# Patient Record
Sex: FEMALE | Race: WHITE
[De-identification: names, ages, dates, MRNs, and addresses within clinical notes are randomized per-mention and may not be internally consistent; named-entity substitution may affect disease eponyms.]

---

## 2017-12-06 ENCOUNTER — HOSPITAL ENCOUNTER (OUTPATIENT)
Dept: HOSPITAL 53 - M WUC | Age: 67
End: 2017-12-06
Attending: NURSE PRACTITIONER
Payer: COMMERCIAL

## 2017-12-06 DIAGNOSIS — I10: Primary | ICD-10-CM

## 2017-12-06 LAB
ALBUMIN SERPL BCG-MCNC: 3.7 GM/DL (ref 3.2–5.2)
ALBUMIN/GLOB SERPL: 1.06 {RATIO} (ref 1–1.93)
ALP SERPL-CCNC: 62 U/L (ref 45–117)
ALT SERPL W P-5'-P-CCNC: 59 U/L (ref 12–78)
ANION GAP SERPL CALC-SCNC: 6 MEQ/L (ref 8–16)
AST SERPL-CCNC: 34 U/L (ref 7–37)
BASOPHILS # BLD AUTO: 0.1 10^3/UL (ref 0–0.2)
BASOPHILS NFR BLD AUTO: 0.8 % (ref 0–1)
BILIRUB SERPL-MCNC: 0.7 MG/DL (ref 0.2–1)
BUN SERPL-MCNC: 12 MG/DL (ref 7–18)
CALCIUM SERPL-MCNC: 9 MG/DL (ref 8.8–10.2)
CHLORIDE SERPL-SCNC: 104 MEQ/L (ref 98–107)
CHOLEST SERPL-MCNC: 249 MG/DL (ref ?–200)
CO2 SERPL-SCNC: 30 MEQ/L (ref 21–32)
CREAT SERPL-MCNC: 0.71 MG/DL (ref 0.55–1.02)
EOSINOPHIL # BLD AUTO: 0.1 10^3/UL (ref 0–0.5)
EOSINOPHIL NFR BLD AUTO: 1.8 % (ref 0–3)
ERYTHROCYTE [DISTWIDTH] IN BLOOD BY AUTOMATED COUNT: 12.6 % (ref 11.5–14.5)
EST. AVERAGE GLUCOSE BLD GHB EST-MCNC: 183 MG/DL (ref 60–110)
GFR SERPL CREATININE-BSD FRML MDRD: > 60 ML/MIN/{1.73_M2} (ref 45–?)
GLUCOSE SERPL-MCNC: 165 MG/DL (ref 80–110)
IMM GRANULOCYTES NFR BLD: 0.3 % (ref 0–0)
LYMPHOCYTES # BLD AUTO: 3.3 10^3/UL (ref 1.5–4.5)
LYMPHOCYTES NFR BLD AUTO: 45.7 % (ref 24–44)
MCH RBC QN AUTO: 29.6 PG (ref 27–33)
MCHC RBC AUTO-ENTMCNC: 32.4 G/DL (ref 32–36.5)
MCV RBC AUTO: 91.5 FL (ref 80–96)
MONOCYTES # BLD AUTO: 0.5 10^3/UL (ref 0–0.8)
MONOCYTES NFR BLD AUTO: 6.7 % (ref 0–5)
NEUTROPHILS # BLD AUTO: 3.2 10^3/UL (ref 1.8–7.7)
NEUTROPHILS NFR BLD AUTO: 44.7 % (ref 36–66)
NRBC BLD AUTO-RTO: 0 % (ref 0–0)
PLATELET # BLD AUTO: 202 10^3/UL (ref 150–450)
POTASSIUM SERPL-SCNC: 4.6 MEQ/L (ref 3.5–5.1)
PROT SERPL-MCNC: 7.2 GM/DL (ref 6.4–8.2)
SODIUM SERPL-SCNC: 140 MEQ/L (ref 136–145)
T4 FREE SERPL-MCNC: 0.97 NG/DL (ref 0.76–1.46)
TRIGL SERPL-MCNC: 187 MG/DL (ref ?–150)
WBC # BLD AUTO: 7.1 10^3/UL (ref 4–10)

## 2019-11-26 ENCOUNTER — HOSPITAL ENCOUNTER (OUTPATIENT)
Dept: HOSPITAL 53 - M WUC | Age: 69
End: 2019-11-26
Attending: NURSE PRACTITIONER
Payer: COMMERCIAL

## 2019-11-26 DIAGNOSIS — Z79.899: ICD-10-CM

## 2019-11-26 DIAGNOSIS — E55.9: ICD-10-CM

## 2019-11-26 DIAGNOSIS — E11.9: Primary | ICD-10-CM

## 2019-11-26 LAB
ALBUMIN SERPL BCG-MCNC: 3.8 GM/DL (ref 3.2–5.2)
ALT SERPL W P-5'-P-CCNC: 68 U/L (ref 12–78)
BASOPHILS # BLD AUTO: 0.1 10^3/UL (ref 0–0.2)
BASOPHILS NFR BLD AUTO: 0.8 % (ref 0–1)
BILIRUB SERPL-MCNC: 0.6 MG/DL (ref 0.2–1)
BUN SERPL-MCNC: 13 MG/DL (ref 7–18)
CALCIUM SERPL-MCNC: 8.7 MG/DL (ref 8.8–10.2)
CHLORIDE SERPL-SCNC: 105 MEQ/L (ref 98–107)
CHOLEST SERPL-MCNC: 228 MG/DL (ref ?–200)
CHOLEST/HDLC SERPL: 6 {RATIO} (ref ?–5)
CO2 SERPL-SCNC: 30 MEQ/L (ref 21–32)
CREAT SERPL-MCNC: 0.68 MG/DL (ref 0.55–1.3)
EOSINOPHIL # BLD AUTO: 0.1 10^3/UL (ref 0–0.5)
EOSINOPHIL NFR BLD AUTO: 1.6 % (ref 0–3)
EST. AVERAGE GLUCOSE BLD GHB EST-MCNC: 169 MG/DL (ref 60–110)
GFR SERPL CREATININE-BSD FRML MDRD: > 60 ML/MIN/{1.73_M2} (ref 45–?)
GLUCOSE SERPL-MCNC: 145 MG/DL (ref 70–100)
HCT VFR BLD AUTO: 39.3 % (ref 36–47)
HDLC SERPL-MCNC: 38 MG/DL (ref 40–?)
HGB BLD-MCNC: 12.3 G/DL (ref 12–15.5)
LDLC SERPL CALC-MCNC: 155 MG/DL (ref ?–100)
LYMPHOCYTES # BLD AUTO: 2.3 10^3/UL (ref 1.5–5)
LYMPHOCYTES NFR BLD AUTO: 36.7 % (ref 24–44)
MCH RBC QN AUTO: 29.5 PG (ref 27–33)
MCHC RBC AUTO-ENTMCNC: 31.3 G/DL (ref 32–36.5)
MCV RBC AUTO: 94.2 FL (ref 80–96)
MONOCYTES # BLD AUTO: 0.5 10^3/UL (ref 0–0.8)
MONOCYTES NFR BLD AUTO: 8.5 % (ref 0–5)
NEUTROPHILS # BLD AUTO: 3.3 10^3/UL (ref 1.5–8.5)
NEUTROPHILS NFR BLD AUTO: 51.9 % (ref 36–66)
NONHDLC SERPL-MCNC: 190 MG/DL
PLATELET # BLD AUTO: 179 10^3/UL (ref 150–450)
POTASSIUM SERPL-SCNC: 4.3 MEQ/L (ref 3.5–5.1)
PROT SERPL-MCNC: 6.9 GM/DL (ref 6.4–8.2)
RBC # BLD AUTO: 4.17 10^6/UL (ref 4–5.4)
SODIUM SERPL-SCNC: 140 MEQ/L (ref 136–145)
TRIGL SERPL-MCNC: 173 MG/DL (ref ?–150)
TSH SERPL DL<=0.005 MIU/L-ACNC: 0.96 UIU/ML (ref 0.36–3.74)
WBC # BLD AUTO: 6.3 10^3/UL (ref 4–10)

## 2020-11-19 ENCOUNTER — HOSPITAL ENCOUNTER (OUTPATIENT)
Dept: HOSPITAL 53 - M RAD | Age: 70
End: 2020-11-19
Attending: PODIATRIST
Payer: COMMERCIAL

## 2020-11-19 DIAGNOSIS — M66.261: Primary | ICD-10-CM

## 2020-11-19 NOTE — REP
INDICATION:

SPONTANEOUS RUPTURE OF EXTENSOR TENDONS, RIGHT LOW.



COMPARISON:

None.



TECHNIQUE:

Multiple sequences are obtained in the axial, coronal and sagittal planes.



FINDINGS:

The Achilles, anterior tibial, posterior tibial, flexor hallucis longus, and flexor

digitorum longus tendons are all intact without significant tenosynovitis.  However,

the peroneus longus tendon demonstrates significant enlargement with increased signal

on T2 weighted images particularly at the level of the calcaneus consistent with

significant tenosynovitis and partial tearing.  The adjacent peroneus brevis tendon

demonstrates mild enlargement and increased signal, with mild surrounding fluid,

compatible with a mild degree of tenosynovitis.



The anterior and posterior talofibular, calcaneofibular and deltoid ligaments appear

intact.



Plantar tendon appears intact. There is no plantar fasciitis.



No ganglion cyst is seen. There is normal amount of joint fluid.



The cartilaginous surfaces are smooth. No osteochondral defect is seen at the

tibiotalar joint.



There is marrow edema in the lateral calcaneus at the peroneal tubercle, adjacent to

the abnormal peroneus longus and brevis tendons.  There is an os trigonum which is not

edematous.



There is edema and mild fluid in the sinus tarsi region which may indicate some degree

of sinus tarsi syndrome.



IMPRESSION:

Significant tenosynovitis and partial tearing of the peroneus longus tendon.  Mild

tenosynovitis peroneal brevis tendon.  These findings are predominantly at the level

of the calcaneus.  There is edema in the adjacent peroneal tubercle of the calcaneus



There is edema and mild fluid in the sinus tarsi region which may indicate some degree

of sinus tarsi syndrome.





<Electronically signed by Tristian Gibbs > 11/19/20 1037

## 2021-01-13 ENCOUNTER — HOSPITAL ENCOUNTER (OUTPATIENT)
Dept: HOSPITAL 53 - M WUC | Age: 71
End: 2021-01-13
Attending: NURSE PRACTITIONER
Payer: COMMERCIAL

## 2021-01-13 DIAGNOSIS — E78.00: ICD-10-CM

## 2021-01-13 DIAGNOSIS — Z79.899: ICD-10-CM

## 2021-01-13 DIAGNOSIS — R00.2: ICD-10-CM

## 2021-01-13 DIAGNOSIS — E11.65: Primary | ICD-10-CM

## 2021-01-13 LAB
ALBUMIN SERPL BCG-MCNC: 3.7 GM/DL (ref 3.2–5.2)
ALT SERPL W P-5'-P-CCNC: 80 U/L (ref 12–78)
BASOPHILS # BLD AUTO: 0.1 10^3/UL (ref 0–0.2)
BASOPHILS NFR BLD AUTO: 0.8 % (ref 0–1)
BILIRUB SERPL-MCNC: 0.6 MG/DL (ref 0.2–1)
BUN SERPL-MCNC: 13 MG/DL (ref 7–18)
CALCIUM SERPL-MCNC: 8.9 MG/DL (ref 8.8–10.2)
CHLORIDE SERPL-SCNC: 105 MEQ/L (ref 98–107)
CHOLEST SERPL-MCNC: 224 MG/DL (ref ?–200)
CHOLEST/HDLC SERPL: 6.05 {RATIO} (ref ?–5)
CO2 SERPL-SCNC: 31 MEQ/L (ref 21–32)
CREAT SERPL-MCNC: 0.8 MG/DL (ref 0.55–1.3)
EOSINOPHIL # BLD AUTO: 0.1 10^3/UL (ref 0–0.5)
EOSINOPHIL NFR BLD AUTO: 1.3 % (ref 0–3)
EST. AVERAGE GLUCOSE BLD GHB EST-MCNC: 166 MG/DL (ref 60–110)
GFR SERPL CREATININE-BSD FRML MDRD: > 60 ML/MIN/{1.73_M2} (ref 45–?)
GLUCOSE SERPL-MCNC: 170 MG/DL (ref 70–100)
HCT VFR BLD AUTO: 38.9 % (ref 36–47)
HDLC SERPL-MCNC: 37 MG/DL (ref 40–?)
HGB BLD-MCNC: 12 G/DL (ref 12–15.5)
LDLC SERPL CALC-MCNC: 150 MG/DL (ref ?–100)
LYMPHOCYTES # BLD AUTO: 2.9 10^3/UL (ref 1.5–5)
LYMPHOCYTES NFR BLD AUTO: 45.9 % (ref 24–44)
MCH RBC QN AUTO: 28.9 PG (ref 27–33)
MCHC RBC AUTO-ENTMCNC: 30.8 G/DL (ref 32–36.5)
MCV RBC AUTO: 93.7 FL (ref 80–96)
MONOCYTES # BLD AUTO: 0.5 10^3/UL (ref 0–0.8)
MONOCYTES NFR BLD AUTO: 7.2 % (ref 0–5)
NEUTROPHILS # BLD AUTO: 2.8 10^3/UL (ref 1.5–8.5)
NEUTROPHILS NFR BLD AUTO: 44.5 % (ref 36–66)
NONHDLC SERPL-MCNC: 187 MG/DL
PLATELET # BLD AUTO: 181 10^3/UL (ref 150–450)
POTASSIUM SERPL-SCNC: 4.2 MEQ/L (ref 3.5–5.1)
PROT SERPL-MCNC: 6.9 GM/DL (ref 6.4–8.2)
RBC # BLD AUTO: 4.15 10^6/UL (ref 4–5.4)
SODIUM SERPL-SCNC: 139 MEQ/L (ref 136–145)
TRIGL SERPL-MCNC: 186 MG/DL (ref ?–150)
WBC # BLD AUTO: 6.4 10^3/UL (ref 4–10)

## 2021-01-21 ENCOUNTER — HOSPITAL ENCOUNTER (OUTPATIENT)
Dept: HOSPITAL 53 - M EKG | Age: 71
End: 2021-01-21
Attending: PODIATRIST
Payer: COMMERCIAL

## 2021-01-21 DIAGNOSIS — M66.261: ICD-10-CM

## 2021-01-21 DIAGNOSIS — M79.671: Primary | ICD-10-CM

## 2021-01-21 NOTE — REP
INDICATION:

SPONTANEOUS RUPTURE OF EXTENSOR TENDONS RLE, EKG 1ST THEN XR



COMPARISON:

03/15/2007



TECHNIQUE:

PA and lateral.



FINDINGS:

The mediastinum and cardiac silhouette are normal.  The lung fields are clear and

without acute consolidation, effusion, or pneumothorax.  The skeletal structures are

intact and normal.



IMPRESSION:

No acute cardiopulmonary process.





<Electronically signed by Carlos A Bunch > 01/21/21 1023

## 2021-01-21 NOTE — ECGEPIP
[facility]

                                       

                                       Test Date:    2021

Pat Name:     KARTHIK MEYER              Department:   

Patient ID:   [id number]                 Room:         -

Gender:       Female                   Technician:   

:          1950               Requested By: Mejia Alvarez 

Order Number: [id number]     Reading MD:   Amy Singh

                                 Measurements

Intervals                              Axis          

Rate:         77                       P:            19

IL:           138                      QRS:          11

QRSD:         93                       T:            50

QT:           392                                    

QTc:          444                                    

                           Interpretive Statements

SINUS RHYTHM

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

ST/T WAVE ABN  BASELINE ARTIFACT V4-6 CANNOT EVAL STT MORPHOLOGY  NO PRIOR

Electronically Signed on 2021 10:41:49 EST by Amy Singh

## 2021-01-31 ENCOUNTER — HOSPITAL ENCOUNTER (OUTPATIENT)
Dept: HOSPITAL 53 - M LABSMTC | Age: 71
End: 2021-01-31
Attending: ANESTHESIOLOGY
Payer: COMMERCIAL

## 2021-01-31 DIAGNOSIS — Z01.812: Primary | ICD-10-CM

## 2021-01-31 DIAGNOSIS — Z20.822: ICD-10-CM

## 2021-02-05 ENCOUNTER — HOSPITAL ENCOUNTER (OUTPATIENT)
Dept: HOSPITAL 53 - M SDC | Age: 71
Discharge: HOME | End: 2021-02-05
Attending: PODIATRIST
Payer: COMMERCIAL

## 2021-02-05 VITALS — DIASTOLIC BLOOD PRESSURE: 97 MMHG | SYSTOLIC BLOOD PRESSURE: 184 MMHG

## 2021-02-05 VITALS — DIASTOLIC BLOOD PRESSURE: 66 MMHG | SYSTOLIC BLOOD PRESSURE: 128 MMHG

## 2021-02-05 VITALS — BODY MASS INDEX: 28.93 KG/M2 | HEIGHT: 66 IN | WEIGHT: 180 LBS

## 2021-02-05 DIAGNOSIS — E11.9: ICD-10-CM

## 2021-02-05 DIAGNOSIS — I10: ICD-10-CM

## 2021-02-05 DIAGNOSIS — Z79.82: ICD-10-CM

## 2021-02-05 DIAGNOSIS — M79.671: ICD-10-CM

## 2021-02-05 DIAGNOSIS — Z79.84: ICD-10-CM

## 2021-02-05 DIAGNOSIS — Z79.899: ICD-10-CM

## 2021-02-05 DIAGNOSIS — R51.9: ICD-10-CM

## 2021-02-05 DIAGNOSIS — M66.871: Primary | ICD-10-CM

## 2021-02-05 PROCEDURE — 97530 THERAPEUTIC ACTIVITIES: CPT

## 2021-02-05 PROCEDURE — 27658 REPAIR OF LEG TENDON EACH: CPT

## 2021-02-05 PROCEDURE — 97116 GAIT TRAINING THERAPY: CPT

## 2021-02-05 PROCEDURE — 88304 TISSUE EXAM BY PATHOLOGIST: CPT

## 2021-02-05 NOTE — RO
OPERATIVE NOTE



DATE OF OPERATION: 02/05/2021



PREOPERATIVE DIAGNOSIS: Peroneal tendon tear, right foot.



POSTOPERATIVE DIAGNOSIS: Peroneal tendon tear, right foot. 



PROCEDURE: 



SURGEON: Mejia Alvarez. 



FIRST ASSISTANT: None. 



ANESTHESIA: General. 



IRRIGATION: Dilute Bacitracin, Neomycin, and Polymyxin B solution. 



ESTIMATED BLOOD LOSS: 10 mL.  



DRAINS UTILIZED: None.



HARDWARE UTILIZED: None.  



DESCRIPTION OF PROCEDURE: On 02/05/2021, this 70-year-old white female was

taken from her hospital room to the operating room and placed on the operating

room table in the right lateral decubitus position. After general anesthesia

was obtained, the thigh tourniquet was rapidly inflated to 250 mmHg; it was

inflated for 80 minutes. Attention was directed to the patient's lateral

surface of the foot, where there was noted to be swelling along the peroneal

tendon sheath. A 6 cm incision was placed just superior to the fibula ending

proximal to the base of the fifth metatarsal. Dissection was then carried down

and the neurovascular bundle was retracted anteriorly and posteriorly to the

peroneal tendon sheath. The peroneal tendon sheath was then entered and

extended inferior and inferior, stopped just at the superior retinaculum and

went inferior to distal to the inferior peroneal retinaculum. The tendons were

then visualized in the peroneal tendon sheath. There was a considerable amount

of hyperplastic synovium, which was then debrided from the tendon sheath. This

exposed the peroneal longus tendon, which was noted to be grossly pathologic

and approximately three to four times its normal circumference. The peroneal

brevis had two issues. The first was a low lying muscle belly. The second issue

was a tear measuring approximately 3 cm starting at the peroneal trochlea and

extending in a superior direction. This tear ended well distal to the lateral

malleolus. The peroneal brevis tendon had all the pathologic synovium resected.

The low lying muscle was then debrided to the fibula and removed. The wound was

flushed with copious amounts of dilute Bacitracin, Neomycin, and Polymyxin B

solution. The peroneal trochlea was noted to be considerably enlarged. This was

ronguered smooth and filed with a handheld file and utilizing bone wax, this

broad cancellous bone had wax applied to provide a smooth surface for the

tendons. The wound was again flushed with copious amounts of dilute Bacitracin,

Neomycin, and Polymyxin B solution. The peroneal brevis tendon was noted to be

torn just proximal to the peroneal sulcus and then extending to the fibula.

This long tear was entered and all diseased tendon was debulked from the center

of the tendon. After debriding the tendon, approximate tendon size was the size

of the peroneal brevis tendon. Therefore, it was elected to keep this tendon to

see if the patient does well after surgery. If the tendon is still painful, she

may need to have the peroneal brevis tendodesed to the longus tendon and this

diseased tendon resected; however, the appearance of the tendon after repair

looked to be adequate. The tendon after debridement was repaired with two 2-0

FiberWire and a buried running stitch. The anterior and posterior sides of the

tendon were repaired. The brevis tendon had a tear just on the anterior surface

so it was tubularized and repaired with 2-0 FiberWire. After the tendons were

repaired, normal bulk was noted to the tendons. The wound was then flushed and

the inferior and superior retinaculum were sutured with 4-0 Monocryl. The

tendon sheath was repaired with 4-0 Monocryl. Skin incision was coapted and

maintained with 4-0 nylon in a simple interrupted type fashion. Prior to

closure, the ankle tourniquet was deflated. All bleeders that were encountered

were electrocoagulated. Minimal bleeding was noted. Sterile dressing was

applied consisting of Adaptic, 4 x 4s, 4 x4 splints, and a Mcdonald compressive

dressing with a posterior splint. The patient had tolerated the procedure well

and was taken from the OR to the recovery room for further monitoring by the

anesthesia department. Postoperative instructions given upon discharge.

## 2021-07-16 ENCOUNTER — HOSPITAL ENCOUNTER (OUTPATIENT)
Dept: HOSPITAL 53 - M WUC | Age: 71
End: 2021-07-16
Attending: NURSE PRACTITIONER
Payer: COMMERCIAL

## 2021-07-16 DIAGNOSIS — E78.00: ICD-10-CM

## 2021-07-16 DIAGNOSIS — E11.65: Primary | ICD-10-CM

## 2021-07-16 LAB
ALBUMIN SERPL BCG-MCNC: 3.9 GM/DL (ref 3.2–5.2)
ALT SERPL W P-5'-P-CCNC: 60 U/L (ref 12–78)
BILIRUB SERPL-MCNC: 0.7 MG/DL (ref 0.2–1)
BUN SERPL-MCNC: 10 MG/DL (ref 7–18)
CALCIUM SERPL-MCNC: 9 MG/DL (ref 8.8–10.2)
CHLORIDE SERPL-SCNC: 106 MEQ/L (ref 98–107)
CHOLEST SERPL-MCNC: 252 MG/DL (ref ?–200)
CHOLEST/HDLC SERPL: 7.41 {RATIO} (ref ?–5)
CO2 SERPL-SCNC: 31 MEQ/L (ref 21–32)
CREAT SERPL-MCNC: 0.74 MG/DL (ref 0.55–1.3)
EST. AVERAGE GLUCOSE BLD GHB EST-MCNC: 163 MG/DL (ref 60–110)
GFR SERPL CREATININE-BSD FRML MDRD: > 60 ML/MIN/{1.73_M2} (ref 45–?)
GLUCOSE SERPL-MCNC: 148 MG/DL (ref 70–100)
HCT VFR BLD AUTO: 39.9 % (ref 36–47)
HDLC SERPL-MCNC: 34 MG/DL (ref 40–?)
HGB BLD-MCNC: 12.7 G/DL (ref 12–15.5)
LDLC SERPL CALC-MCNC: 157 MG/DL (ref ?–100)
MCH RBC QN AUTO: 29 PG (ref 27–33)
MCHC RBC AUTO-ENTMCNC: 31.8 G/DL (ref 32–36.5)
MCV RBC AUTO: 91.1 FL (ref 80–96)
NONHDLC SERPL-MCNC: 218 MG/DL
PLATELET # BLD AUTO: 200 10^3/UL (ref 150–450)
POTASSIUM SERPL-SCNC: 3.9 MEQ/L (ref 3.5–5.1)
PROT SERPL-MCNC: 7.1 GM/DL (ref 6.4–8.2)
RBC # BLD AUTO: 4.38 10^6/UL (ref 4–5.4)
SODIUM SERPL-SCNC: 138 MEQ/L (ref 136–145)
TRIGL SERPL-MCNC: 307 MG/DL (ref ?–150)
WBC # BLD AUTO: 8.3 10^3/UL (ref 4–10)

## 2021-07-30 ENCOUNTER — HOSPITAL ENCOUNTER (OUTPATIENT)
Dept: HOSPITAL 53 - M LAB REF | Age: 71
End: 2021-07-30
Attending: NURSE PRACTITIONER
Payer: COMMERCIAL

## 2021-07-30 DIAGNOSIS — N39.0: Primary | ICD-10-CM

## 2021-08-11 ENCOUNTER — HOSPITAL ENCOUNTER (OUTPATIENT)
Dept: HOSPITAL 53 - M WHC | Age: 71
End: 2021-08-11
Attending: NURSE PRACTITIONER
Payer: COMMERCIAL

## 2021-08-11 DIAGNOSIS — Z12.31: Primary | ICD-10-CM

## 2021-08-11 DIAGNOSIS — M81.0: ICD-10-CM

## 2021-08-11 NOTE — DEXAMM
INDICATION:

SCREENING FOR OSTEOPOROSIS.



COMPARISON:

Comparison study is from April 6, 2005..



TECHNIQUE:

Bone density was measured using dual-energy x-ray absorptionmetry (DEXA).



FINDINGS:

AP SPINE L1-L4

BMD 1.313 g/cm2

Young Adult T-Score 1.0

Age Matched Z-Score 2.6.



LT FEMUR, TOTAL

BMD 1.037 g/cm2

Young Adult T-Score 0.2

Age Matched Z-Score 1.6.



LT NECK

BMD 1.108 g/cm2

Young Adult T-Score 0.5

Age Matched Z-Score 2.1.



RT FEMUR, TOTAL

BMD 1.052 g/cm2

Young Adult T-Score 0.4

Age Matched Z-Score 1.8.



RT NECK

BMD 1.078 g/cm2

Young Adult T-Score 0.3

Age Matched Z-Score 1.9.



IMPRESSION:

There is normal bone density of the spine.



There is normal bone density of the left hip.





There is normal bone density of the right hip.



The density of the spine has decreased 10.8% since the initial exam on April 6, 2005.



The density of the left hip has decreased 8.4% since initial exam on April 6, 2005.



The density of the right hip has decreased 7.9% since the initial exam on April 6, 2005.



FOLLOW-UP:

Recommendation for the next bone density exam: 5 years.





<Electronically signed by Jose E Ch > 08/11/21 104

## 2021-08-11 NOTE — REPMRS
Patient History

The patient states she has not had a clinical breast exam in over

a year.

Patient is postmenopausal.

Family history of colorectal cancer at age 58 in sister, breast 

cancer at age 35 in paternal aunt, breast cancer at age 64 in 

paternal cousin.

Patient states no breast complaints today. Patient has signed [facility]

History Sheet.

 

Digital Woman Screen Mammo: August 11, 2021 - Exam #: 

[id number]

Bilateral CC and MLO view(s) were taken.

 

Technologist: Zulema Everett Technologist

Prior study comparison: October 2, 2019, bilateral digital mammo 

screening bilat, performed at [facility].  

February 20, 2018, bilateral digital mammo screening bilat, 

performed at [facility].  July 18, 2016, 

bilateral digital mammo screening bilat, performed at [facility] 

[place].

 

FINDINGS: The breast tissue is heterogeneously dense.  This may 

lower the sensitivity of mammography.  The Volpara volumetric 

breast density category is: C.  There is a moderate amount of 

heterogeneously dense fibroglandular tissue which is fairly 

symmetric. There is no interval development of dominant mass, 

architectural distortion, or grouped microcalcification typical 

of malignancy. There has been no change in the appearance of the 

mammogram from the prior studies.

3-D tomosynthesis shows no additional findings.

 

Assessment: BI-RADS/ACR category 1 mammogram. Negative Mammogram.

 

Recommendation

Routine screening mammogram of both breasts in 1 year (for women 

over age 40).

This patient's [facility] Lifetime Breast Cancer RIsk is 

estimated at 9.2 %.

This mammogram was interpreted with the aid of an FDA-approved 

computer-aided dectection system.

 

Electronically Signed By: Jose E Ch MD 08/11/21 0290

## 2021-09-28 ENCOUNTER — HOSPITAL ENCOUNTER (OUTPATIENT)
Dept: HOSPITAL 53 - M WUC | Age: 71
End: 2021-09-28
Attending: NURSE PRACTITIONER
Payer: COMMERCIAL

## 2021-09-28 DIAGNOSIS — Z01.811: Primary | ICD-10-CM

## 2021-09-28 LAB
25(OH)D3 SERPL-MCNC: 64.3 NG/ML (ref 30–100)
ALBUMIN SERPL BCG-MCNC: 3.9 GM/DL (ref 3.2–5.2)
ALT SERPL W P-5'-P-CCNC: 44 U/L (ref 12–78)
BILIRUB SERPL-MCNC: 0.8 MG/DL (ref 0.2–1)
BUN SERPL-MCNC: 12 MG/DL (ref 7–18)
CALCIUM SERPL-MCNC: 9.3 MG/DL (ref 8.8–10.2)
CHLORIDE SERPL-SCNC: 107 MEQ/L (ref 98–107)
CHOLEST SERPL-MCNC: 248 MG/DL (ref ?–200)
CHOLEST/HDLC SERPL: 7.29 {RATIO} (ref ?–5)
CO2 SERPL-SCNC: 31 MEQ/L (ref 21–32)
CREAT SERPL-MCNC: 0.76 MG/DL (ref 0.55–1.3)
EST. AVERAGE GLUCOSE BLD GHB EST-MCNC: 160 MG/DL (ref 60–110)
GFR SERPL CREATININE-BSD FRML MDRD: > 60 ML/MIN/{1.73_M2} (ref 45–?)
GLUCOSE SERPL-MCNC: 129 MG/DL (ref 70–100)
HCT VFR BLD AUTO: 39.4 % (ref 36–47)
HDLC SERPL-MCNC: 34 MG/DL (ref 40–?)
HGB BLD-MCNC: 12.8 G/DL (ref 12–15.5)
LDLC SERPL CALC-MCNC: 157 MG/DL (ref ?–100)
MCH RBC QN AUTO: 29.3 PG (ref 27–33)
MCHC RBC AUTO-ENTMCNC: 32.5 G/DL (ref 32–36.5)
MCV RBC AUTO: 90.2 FL (ref 80–96)
NONHDLC SERPL-MCNC: 214 MG/DL
PLATELET # BLD AUTO: 207 10^3/UL (ref 150–450)
POTASSIUM SERPL-SCNC: 4.1 MEQ/L (ref 3.5–5.1)
PROT SERPL-MCNC: 7.4 GM/DL (ref 6.4–8.2)
RBC # BLD AUTO: 4.37 10^6/UL (ref 4–5.4)
SODIUM SERPL-SCNC: 141 MEQ/L (ref 136–145)
TRIGL SERPL-MCNC: 284 MG/DL (ref ?–150)
TSH SERPL DL<=0.005 MIU/L-ACNC: 1.39 UIU/ML (ref 0.36–3.74)
WBC # BLD AUTO: 7.1 10^3/UL (ref 4–10)

## 2021-09-28 NOTE — REP
INDICATION:

PREOP TESTING.



COMPARISON:

Comparison chest x-ray January 21, 2021.



TECHNIQUE:

Two views..



FINDINGS:

There is a dextroconvex curvature in the upper lumbar spine and degenerative changes

are seen in the thoracic spine.  The lungs are well inflated and clear.  The heart is

not felt to be enlarged.  Pulmonary vasculature is not increased.  Pleural angles are

sharp.



IMPRESSION:

No active cardiopulmonary disease.





<Electronically signed by Jose E Ch > 09/28/21 1300

## 2022-01-12 ENCOUNTER — HOSPITAL ENCOUNTER (OUTPATIENT)
Dept: HOSPITAL 53 - M WUC | Age: 72
End: 2022-01-12
Attending: NURSE PRACTITIONER
Payer: COMMERCIAL

## 2022-01-12 DIAGNOSIS — M25.571: Primary | ICD-10-CM

## 2022-03-24 ENCOUNTER — HOSPITAL ENCOUNTER (OUTPATIENT)
Dept: HOSPITAL 53 - M LABSMTC | Age: 72
End: 2022-03-24
Attending: ANESTHESIOLOGY
Payer: COMMERCIAL

## 2022-03-24 DIAGNOSIS — Z11.52: ICD-10-CM

## 2022-03-24 DIAGNOSIS — Z01.818: Primary | ICD-10-CM

## 2022-03-29 ENCOUNTER — HOSPITAL ENCOUNTER (OUTPATIENT)
Dept: HOSPITAL 53 - M OPP | Age: 72
Discharge: HOME | End: 2022-03-29
Attending: INTERNAL MEDICINE
Payer: COMMERCIAL

## 2022-03-29 VITALS — HEIGHT: 65 IN | BODY MASS INDEX: 28.32 KG/M2 | WEIGHT: 170 LBS

## 2022-03-29 VITALS — SYSTOLIC BLOOD PRESSURE: 159 MMHG | DIASTOLIC BLOOD PRESSURE: 83 MMHG

## 2022-03-29 DIAGNOSIS — E78.5: ICD-10-CM

## 2022-03-29 DIAGNOSIS — Z80.0: ICD-10-CM

## 2022-03-29 DIAGNOSIS — Z79.84: ICD-10-CM

## 2022-03-29 DIAGNOSIS — Z79.899: ICD-10-CM

## 2022-03-29 DIAGNOSIS — Z12.11: Primary | ICD-10-CM

## 2022-03-29 DIAGNOSIS — Z79.82: ICD-10-CM

## 2022-03-29 DIAGNOSIS — E11.9: ICD-10-CM

## 2022-03-29 DIAGNOSIS — I10: ICD-10-CM

## 2022-05-04 ENCOUNTER — HOSPITAL ENCOUNTER (OUTPATIENT)
Dept: HOSPITAL 53 - M WUC | Age: 72
End: 2022-05-04
Attending: NURSE PRACTITIONER
Payer: COMMERCIAL

## 2022-05-04 DIAGNOSIS — E78.00: ICD-10-CM

## 2022-05-04 DIAGNOSIS — E11.9: ICD-10-CM

## 2022-05-04 DIAGNOSIS — E55.9: ICD-10-CM

## 2022-05-04 DIAGNOSIS — Z00.01: Primary | ICD-10-CM

## 2022-05-04 LAB
25(OH)D3 SERPL-MCNC: 52.9 NG/ML (ref 30–100)
ALBUMIN SERPL BCG-MCNC: 3.8 GM/DL (ref 3.2–5.2)
ALT SERPL W P-5'-P-CCNC: 43 U/L (ref 12–78)
BILIRUB SERPL-MCNC: 0.6 MG/DL (ref 0.2–1)
BUN SERPL-MCNC: 11 MG/DL (ref 7–18)
CALCIUM SERPL-MCNC: 9.4 MG/DL (ref 8.8–10.2)
CHLORIDE SERPL-SCNC: 106 MEQ/L (ref 98–107)
CHOLEST SERPL-MCNC: 203 MG/DL (ref ?–200)
CHOLEST/HDLC SERPL: 6.15 {RATIO} (ref ?–5)
CO2 SERPL-SCNC: 28 MEQ/L (ref 21–32)
CREAT SERPL-MCNC: 0.71 MG/DL (ref 0.55–1.3)
EST. AVERAGE GLUCOSE BLD GHB EST-MCNC: 163 MG/DL (ref 60–110)
GFR SERPL CREATININE-BSD FRML MDRD: > 60 ML/MIN/{1.73_M2} (ref 45–?)
GLUCOSE SERPL-MCNC: 157 MG/DL (ref 70–100)
HCT VFR BLD AUTO: 37.4 % (ref 36–47)
HDLC SERPL-MCNC: 33 MG/DL (ref 40–?)
HGB BLD-MCNC: 11.7 G/DL (ref 12–15.5)
LDLC SERPL CALC-MCNC: 109 MG/DL (ref ?–100)
MCH RBC QN AUTO: 28.7 PG (ref 27–33)
MCHC RBC AUTO-ENTMCNC: 31.3 G/DL (ref 32–36.5)
MCV RBC AUTO: 91.7 FL (ref 80–96)
NONHDLC SERPL-MCNC: 170 MG/DL
PLATELET # BLD AUTO: 184 10^3/UL (ref 150–450)
POTASSIUM SERPL-SCNC: 4.1 MEQ/L (ref 3.5–5.1)
PROT SERPL-MCNC: 6.8 GM/DL (ref 6.4–8.2)
RBC # BLD AUTO: 4.08 10^6/UL (ref 4–5.4)
SODIUM SERPL-SCNC: 140 MEQ/L (ref 136–145)
TRIGL SERPL-MCNC: 306 MG/DL (ref ?–150)
WBC # BLD AUTO: 7.2 10^3/UL (ref 4–10)

## 2022-07-05 ENCOUNTER — HOSPITAL ENCOUNTER (OUTPATIENT)
Dept: HOSPITAL 53 - M WUC | Age: 72
End: 2022-07-05
Attending: PHYSICIAN ASSISTANT
Payer: COMMERCIAL

## 2022-07-05 DIAGNOSIS — S60.011A: ICD-10-CM

## 2022-07-05 DIAGNOSIS — S20.211A: ICD-10-CM

## 2022-07-05 DIAGNOSIS — S00.83XA: Primary | ICD-10-CM

## 2022-12-02 ENCOUNTER — HOSPITAL ENCOUNTER (OUTPATIENT)
Dept: HOSPITAL 53 - M LABWUC | Age: 72
End: 2022-12-02
Attending: NURSE PRACTITIONER
Payer: COMMERCIAL

## 2022-12-02 DIAGNOSIS — I10: Primary | ICD-10-CM

## 2022-12-02 LAB
25(OH)D3 SERPL-MCNC: 67.4 NG/ML (ref 20–100)
ALBUMIN SERPL BCG-MCNC: 3.9 G/DL (ref 3.2–5.2)
ALP SERPL-CCNC: 66 U/L (ref 46–116)
ALT SERPL W P-5'-P-CCNC: 44 U/L (ref 7–40)
AST SERPL-CCNC: 29 U/L (ref ?–34)
BILIRUB SERPL-MCNC: 0.8 MG/DL (ref 0.3–1.2)
BUN SERPL-MCNC: 13 MG/DL (ref 9–23)
CALCIUM SERPL-MCNC: 8.9 MG/DL (ref 8.3–10.6)
CHLORIDE SERPL-SCNC: 104 MMOL/L (ref 98–107)
CHOLEST SERPL-MCNC: 145 MG/DL (ref ?–200)
CHOLEST/HDLC SERPL: 4.03 {RATIO} (ref ?–5)
CO2 SERPL-SCNC: 29 MMOL/L (ref 20–31)
CREAT SERPL-MCNC: 0.62 MG/DL (ref 0.55–1.3)
EST. AVERAGE GLUCOSE BLD GHB EST-MCNC: 160 MG/DL (ref 60–110)
GFR SERPL CREATININE-BSD FRML MDRD: > 60 ML/MIN/{1.73_M2} (ref 39–?)
GLUCOSE SERPL-MCNC: 142 MG/DL (ref 74–106)
HCT VFR BLD AUTO: 37.8 % (ref 36–47)
HDLC SERPL-MCNC: 35.9 MG/DL (ref 40–?)
HGB BLD-MCNC: 11.7 G/DL (ref 12–15.5)
LDLC SERPL CALC-MCNC: 74.9 MG/DL (ref ?–100)
MCH RBC QN AUTO: 28.9 PG (ref 27–33)
MCHC RBC AUTO-ENTMCNC: 31 G/DL (ref 32–36.5)
MCV RBC AUTO: 93.3 FL (ref 80–96)
NONHDLC SERPL-MCNC: 109 MG/DL
PLATELET # BLD AUTO: 177 10^3/UL (ref 150–450)
POTASSIUM SERPL-SCNC: 4 MMOL/L (ref 3.5–5.1)
PROT SERPL-MCNC: 6.8 G/DL (ref 5.7–8.2)
RBC # BLD AUTO: 4.05 10^6/UL (ref 4–5.4)
SODIUM SERPL-SCNC: 141 MMOL/L (ref 136–145)
TRIGL SERPL-MCNC: 171 MG/DL (ref ?–150)
TSH SERPL DL<=0.005 MIU/L-ACNC: 1.43 UIU/ML (ref 0.55–4.78)
WBC # BLD AUTO: 6.8 10^3/UL (ref 4–10)

## 2025-02-18 ENCOUNTER — HOSPITAL ENCOUNTER (OUTPATIENT)
Dept: HOSPITAL 53 - M WUC | Age: 75
End: 2025-02-18
Payer: COMMERCIAL

## 2025-02-18 DIAGNOSIS — E78.5: ICD-10-CM

## 2025-02-18 DIAGNOSIS — E55.9: ICD-10-CM

## 2025-02-18 DIAGNOSIS — I10: ICD-10-CM

## 2025-02-18 DIAGNOSIS — Z79.899: ICD-10-CM

## 2025-02-18 DIAGNOSIS — E11.9: Primary | ICD-10-CM

## 2025-02-18 LAB
ALBUMIN SERPL BCG-MCNC: 3.9 G/DL (ref 3.2–5.2)
ALP SERPL-CCNC: 74 U/L (ref 35–104)
ALT SERPL W P-5'-P-CCNC: 50 U/L (ref 7–40)
AST SERPL-CCNC: 34 U/L (ref ?–34)
BILIRUB SERPL-MCNC: 0.7 MG/DL (ref 0.3–1.2)
BUN SERPL-MCNC: 13 MG/DL (ref 9–23)
CALCIUM SERPL-MCNC: 9.4 MG/DL (ref 8.3–10.6)
CHLORIDE SERPL-SCNC: 104 MMOL/L (ref 98–107)
CHOLEST SERPL-MCNC: 173 MG/DL (ref ?–200)
CHOLEST/HDLC SERPL: 4.13 {RATIO} (ref ?–5)
CO2 SERPL-SCNC: 29 MMOL/L (ref 20–31)
CREAT SERPL-MCNC: 0.61 MG/DL (ref 0.55–1.3)
EST. AVERAGE GLUCOSE BLD GHB EST-MCNC: 171 MG/DL (ref 60–110)
GFR SERPL CREATININE-BSD FRML MDRD: > 60 ML/MIN/{1.73_M2} (ref 39–?)
GLUCOSE SERPL-MCNC: 156 MG/DL (ref 74–106)
HCT VFR BLD AUTO: 39.3 % (ref 36–47)
HDLC SERPL-MCNC: 41.8 MG/DL (ref 40–?)
HGB BLD-MCNC: 12.5 G/DL (ref 12–15.5)
LDLC SERPL CALC-MCNC: 91.8 MG/DL (ref ?–100)
MCH RBC QN AUTO: 29.3 PG (ref 27–33)
MCHC RBC AUTO-ENTMCNC: 31.8 G/DL (ref 32–36.5)
MCV RBC AUTO: 92 FL (ref 80–96)
NONHDLC SERPL-MCNC: 131.2 MG/DL
PLATELET # BLD AUTO: 191 10^3/UL (ref 150–450)
POTASSIUM SERPL-SCNC: 4 MMOL/L (ref 3.5–5.1)
PROT SERPL-MCNC: 7.3 G/DL (ref 5.7–8.2)
RBC # BLD AUTO: 4.27 10^6/UL (ref 4–5.4)
SODIUM SERPL-SCNC: 141 MMOL/L (ref 136–145)
T4 FREE SERPL-MCNC: 1.09 NG/DL (ref 0.89–1.76)
TRIGL SERPL-MCNC: 197 MG/DL (ref ?–150)
TSH SERPL DL<=0.005 MIU/L-ACNC: 1.6 UIU/ML (ref 0.55–4.78)
WBC # BLD AUTO: 8.4 10^3/UL (ref 4–10)

## 2025-02-20 ENCOUNTER — HOSPITAL ENCOUNTER (OUTPATIENT)
Dept: HOSPITAL 53 - M WHC | Age: 75
End: 2025-02-20
Attending: INTERNAL MEDICINE
Payer: COMMERCIAL

## 2025-02-20 DIAGNOSIS — Z13.820: Primary | ICD-10-CM
